# Patient Record
Sex: FEMALE | Race: OTHER
[De-identification: names, ages, dates, MRNs, and addresses within clinical notes are randomized per-mention and may not be internally consistent; named-entity substitution may affect disease eponyms.]

---

## 2020-09-20 ENCOUNTER — HOSPITAL ENCOUNTER (EMERGENCY)
Dept: HOSPITAL 72 - EMR | Age: 40
LOS: 1 days | Discharge: HOME | End: 2020-09-21
Payer: MEDICAID

## 2020-09-20 VITALS — SYSTOLIC BLOOD PRESSURE: 138 MMHG | DIASTOLIC BLOOD PRESSURE: 88 MMHG

## 2020-09-20 VITALS — BODY MASS INDEX: 25.46 KG/M2 | WEIGHT: 110 LBS | HEIGHT: 55 IN
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
## 2020-09-20 DIAGNOSIS — M25.532: Primary | ICD-10-CM
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
## 2020-09-20 DIAGNOSIS — M54.9: ICD-10-CM

## 2020-09-20 DIAGNOSIS — M25.512: ICD-10-CM

## 2020-09-20 PROCEDURE — 73090 X-RAY EXAM OF FOREARM: CPT

## 2020-09-20 PROCEDURE — 73030 X-RAY EXAM OF SHOULDER: CPT

## 2020-09-20 PROCEDURE — 99284 EMERGENCY DEPT VISIT MOD MDM: CPT

## 2020-09-20 PROCEDURE — 73060 X-RAY EXAM OF HUMERUS: CPT

## 2020-09-20 PROCEDURE — 29125 APPL SHORT ARM SPLINT STATIC: CPT

## 2020-09-20 NOTE — NUR
ED Nurse Note:



pt ambulated into ed from home CO s/p slip and fall at home landing on right 
hand and forearm resulting in pain 8/10. Pt denies head injury or LOC. Pt aao x 
4, ambulates with steady gait. VSS no ss of distress noted. will continue to 
monitor. Awaiting ERMD at bedside. Awaiting further orders.

## 2020-09-20 NOTE — NUR
ED Nurse Note:



pt returned from Xray in stable condition. Thumb spica applied. Peripheral 
pulses and sensation present. will continue to monitor.

## 2020-09-21 VITALS — DIASTOLIC BLOOD PRESSURE: 86 MMHG | SYSTOLIC BLOOD PRESSURE: 129 MMHG

## 2020-09-21 NOTE — DIAGNOSTIC IMAGING REPORT
EXAM:

  XR Left Forearm, 2 Views

 

CLINICAL HISTORY:

  PAIN

 

TECHNIQUE:

  Frontal and lateral views of the left forearm.

 

COMPARISON:

  No relevant prior studies available.

 

FINDINGS:

  Bones/joints:  No fracture or malalignment.

  Soft tissues:  Unremarkable.

 

IMPRESSION:     

  No fracture or malalignment.

## 2020-09-21 NOTE — DIAGNOSTIC IMAGING REPORT
EXAM:

  XR Left Humerus, 2 or More Views

 

CLINICAL HISTORY:

  PAIN

 

TECHNIQUE:

  Frontal and lateral views of the left humerus.

 

COMPARISON:

  None.

 

FINDINGS:

  Bones/joints:  Unremarkable.  No acute fracture.  No dislocation.

  Soft tissues:  Unremarkable.

 

IMPRESSION:     

No acute osseous abnormality.

## 2020-09-21 NOTE — DIAGNOSTIC IMAGING REPORT
EXAM:

  XR Left Shoulder Complete, 2 or More Views

 

CLINICAL HISTORY:

  PAIN

 

TECHNIQUE:

  Two or more views of the left shoulder.

 

COMPARISON:

  None.

 

FINDINGS:

  Bones/joints:  Unremarkable.  No acute fracture.  No dislocation.

  Soft tissues:  Unremarkable.

 

IMPRESSION:     

No acute osseous abnormality.

## 2020-09-21 NOTE — EMERGENCY ROOM REPORT
History of Present Illness


General


Chief Complaint:  Multiple Trauma/Fall


Source:  Patient





Present Illness


HPI


40-year-old  female with no prior medical history complains of left 

shoulder and left wrist pain status post mechanical slip and fall this morning 

in a grocery store.


Patient states that there was water on the floor causing her to slip and lunged 

forward on her left leg and causing her to fall on her left shoulder.


Patient is right-hand dominant.


She did not hit her head, lose consciousness, experience neck, back, abdominal 

pain, chest pain, shortness of breath, syncope, focal weakness, slurred speech, 

fever, or other symptoms.


Initially, she said that her mid back hurt it because of the way her body 

contorted when she fell.


She denies saddle anesthesia, urinary retention, bowel or bladder incontinence, 

or focal weakness


The patient's symptoms were gradual onset, severity was moderate, duration since

1 day.  


Quality: Aching





Past medical history:  Denies


Past surgical history:  Denies





Smoking:  Denies


Alcohol use: Occasional


Drug use:  Denies





Review of systems:


CONST: No fevers or chills, No night sweats


PULMONARY: No productive cough,  No shortness of breath 


CARDIAC: No chest pain, No palpitations 


GI: No vomiting, No diarrhea , No melena_or_BRBPR 


: No dysuria, No hematuria, No discharge 


NEURO: No new_focal_weakness_or_numbness, No confusion, No vision changes


14 point Review of Systems is otherwise negative except per HPI





Physical Exam:


GENERAL: Awake_alert_ nontoxic, no acute distress Spo2 98% on RA -normal


EYES: Extraocular muscles are intact. Conjunctivae clear. Lids without swelling


ENT: External nose and ear normal_in_appearance. Oropharynx clear. 

Head_atraumatic, Moist_oral_mucosa


NECK:  No JVD. No meningismus. No thyromegaly.  Supple. Trachea midline


No midline C/T/L-spine step-offs or tenderness to palpation.


Mid thoracic paraspinal hypertonicity.  No deformity.  No pelvic instability.


RESP: Normal respiratory effort. Symmetric rise. No stridor. 

Clear_to_auscultation_No_rales_No_wheezes


CARDIAC: Regular rate and regular rhytm. No_significant pedal edema.


ABDOMEN: Soft. Nondistended.  Nontender_No_rebound_or_guarding.


MSK:  Normal muscle tone, without rigidity.  Extremities without asymmetric 

deformity or swelling.  


Upper extremity exam: Left


 Elbow: No swelling / effusion appreciated, no significant pain with passive 

range of motion


 Wrist: No swelling / effusion appreciated, no significant pain with passive 

range of motion


 Lateral epicondyle: no tenderness / swelling / ecchymoses


 Medial epicondyle: no tenderness / swelling / ecchymoses


 Radial pulse: 2+


 Capillary refill: <3 seconds in all fingers


 All fingers: full range of motion without any tenderness / swelling / deformity

/ evidence of infection


 Radian / Median / Ulnar nerves: all intact (finger opposition, finger adduction

/ abduction, thumb dorsiflexion)


 Sensation intact to light touch: in all fingers


 Strength 5/5 with: wrist dorsi / volar flexion, hand , elbow flexion / 

extension


++L Snuff box TTP


SKIN: Warm and dry.  No visible cyanosis or pallor


NEUROLOGIC: Alert, oriented x3.  Motor_and_sensation_grossly_intact. No truncal 

ataxia. Gait_normal


Psych: Normal mood and affect, normal judgment and insight











- COORDINATION OF CARE


Case was discussed with: Patient  





Any imaging ordered were interpreted as part of the medical decision making:








Medical Decision Making/Plan:





Differential diagnosis includes musculoskeletal pain, muscle sprain, contusion, 

fracture, dislocation 


DOUBT compartment syndrome, arterial occlusion, nerve damage, among others.





Patient is neurologically intact and well-appearing.  Examination of the left 

upper extremity is neurovascularly intact with soft compartments.  There are no 

gross deformities.


She is found to have mid thoracic hypertonicity without any midline step-offs or

deformities.  Patient has full active and passive range of motion in the 

bilateral upper and lower extremity.  There are no red flags for back pain.  

Mechanism was fall from standing height.  No high mechanism injury.


Distally the patient has capillary refill <2 seconds and strong pulses.  There 

is no pallor or pain out of proportion to exam.  There is no significant swellin

g, deformity, or report of significant dislocation that subsequently reduced.


No evidence of arterial occlusion or injury.  The  associated joints have full 

range of motion without any significant pain or restriction in mobility.  No 

evidence at this time of major ligamentous disruption.





Xrays of the left shoulder, humerus, arm, elbow, and wrist are within normal 

limits, compartments are soft, the patient is able to bear weight and has no 

neurologic deficits.  No evidence of fracture, dislocation, foreign body, 

significant nerve damage, compartment syndrome at this time.  


She does have mild left snuffbox tenderness to palpation.  For this reason, a 

Velcro thumb spica splint was applied.  





The patient was informed that occult fractures or foreign bodies are not always 

apparent on their first visit and understand to follow up with their regular 

doctor for a reevaluation within the next 2-3 days, to ensure their symptoms 

completely resolve.


Patient declined x-rays of the spine.





Pain was controlled with Tylenol.  Suspect bone contusion versus muscle spasm as

etiology of her pain 


Patient was instructed to be nonweightbearing to the left hand (nondominant) 

until repeat x-rays were performed ruling out scaphoid fracture in 2 weeks.


Pertinent results reviewed with the patient. I educated the patient on the 

current treatment plan including the risks, benefits, and alternatives. I also 

discussed the extent and limitations of the current evaluation. The patient 

expressed understanding and agreement with plan. I recommended PMD follow-up 

within 1-2 days. Also advised that the patient return to the Emergency 

Department as soon as possible if they experience any new, persistent, or 

worsening symptoms.


Allergies:  


Coded Allergies:  


     No Known Allergies (Unverified , 9/20/20)





COVID-19 Screening


Contact w/high risk pt:  No


Experienced COVID-19 symptoms?:  No


COVID-19 Testing performed PTA:  No





Patient History


Last Menstrual Period:  september 5 2020





Nursing Documentation-[facility]


Past Medical History:  No Stated History





Physical Exam





Vital Signs








  Date Time  Temp Pulse Resp B/P (MAP) Pulse Ox O2 Delivery O2 Flow Rate FiO2


 


9/20/20 22:40 97.2 73 15 138/88 (105) 97 Room Air  








Sp02 EP Interpretation:  reviewed, normal





Procedures


Critical Care Time


Critical Care Time


Procedure note left thumb spica splint:   splint applied to left wrist


Splint applied by tech with direct supervision by me.  


Reassessed following splint application. Neurovascular intact.  


Compartments remain soft and compressible. 


Pt tolerated well without complications. 


Splint care instructions were discussed. 


Pt to follow up with orthopedics within 1 week to prevent future arthritis and 

long term disability.





Medical Decision Making


Diagnostic Impression:  


   Primary Impression:  


   Wrist pain


   Additional Impressions:  


   Muscle ache of extremity


   Shoulder pain, left





Other X-Ray Diagnostic Results


Other X-Ray Diagnostic Results :  


   PA Scribe Text


Left shoulder X-ray: 


Views:  2  view(s)


No fracture.  Normal alignment.  Soft tissues normal.  Joint spaces normal.  


Indication: Pain


Impression:  no acute disease 


The X-ray(s) were independently viewed and interpreted contemporaneously - 

Electronically signed by Milagro chowdary DO





Left   humerus X-ray: 


Views: 2 view(s)


No fracture.  Normal alignment.  Soft tissues normal.  Joint spaces normal.  


Indication: Pain


Impression:  no acute disease 


The X-ray(s) were independently viewed and interpreted contemporaneously - 

Electronically signed by Milagro chowdary DO








Left forearm X-ray: 


Views:  3  view(s)


No fracture.  Normal alignment.  Soft tissues normal.  Joint spaces normal.  


Indication: Pain


Impression:  no acute disease 


The X-ray(s) were independently viewed and interpreted contemporaneously - 

Electronically signed by Milagro chowdary DO


Reevaluation Time:  00:05





Last Vital Signs








  Date Time  Temp Pulse Resp B/P (MAP) Pulse Ox O2 Delivery O2 Flow Rate FiO2


 


9/20/20 23:45 97.2       


 


9/20/20 22:50  78 15 138/88 97 Room Air  








Status:  improved


Disposition:  HOME, SELF-CARE


Admit Decision Time:  00:05


Condition:  Stable


Scripts


Acetaminophen* (TYLENOL EXTRA STRENGTH*) 500 Mg Tablet


500 MG ORAL Q8H PRN for Prn Headache/Temp > 101, #30 TAB 0 Refills


   Prov: Milagro Enriquez D.O.         9/20/20


Referrals:  


NOT CHOSEN IPA/MD,REFERRING (PCP)











[facility]











[facility], [facility]


Patient Instructions:  Muscle Strain, Easy-to-Read, Contusion, Easy-to-Read





Additional Instructions:  








Instructions for patient/caretaker:


Follow up with your physician in 1-2 days. 


Follow-up with your doctor sooner if your condition requires a more timely 

clinical reevaluation.


Return to the emergency department immediately if you feel that your condition 

is worsening or if you have any new or concerning symptoms.


Review your discharge instructions and take any prescriptions given as ins

tructed.








Since there is always the possibility of X-ray variance, you should get a copy 

of the final report of your imaging studies from medical records in 2-3 days in 

case of discrepancy, or you can have your regular doctor obtain these from the 

hospital.  





Hairline fractures or occult fractures can also be missed on the first visit so 

if you are having persistent pain and persistent decreased function after 1 week

you should return for repeat evaluation and potentially repeat imaging.





Keep your Velcro splint intact until cleared by repeat x-rays








[facility] PROVIDES FREE OR LOW-COST HEALTH SERVICES TO PEOPLE WHO CAN SHOW PROO

F THAT THEY LIVE IN [facility].


TO FIND MORE CLINICS PARTNERED WITH THE [facility] TO PROVIDE SERVICE, PLEASE CALL 

[phone].











Milagro Enriquez D.O.           Sep 21, 2020 00:05